# Patient Record
Sex: MALE | Race: WHITE | Employment: OTHER | URBAN - METROPOLITAN AREA
[De-identification: names, ages, dates, MRNs, and addresses within clinical notes are randomized per-mention and may not be internally consistent; named-entity substitution may affect disease eponyms.]

---

## 2021-01-15 DIAGNOSIS — Z23 NEED FOR VACCINATION: ICD-10-CM

## 2022-11-19 ENCOUNTER — HOSPITAL ENCOUNTER (EMERGENCY)
Dept: GENERAL RADIOLOGY | Age: 72
Discharge: HOME | End: 2022-11-22
Payer: MEDICARE

## 2022-11-19 ENCOUNTER — HOSPITAL ENCOUNTER (EMERGENCY)
Age: 72
Discharge: HOME | End: 2022-11-19
Attending: EMERGENCY MEDICINE
Payer: MEDICARE

## 2022-11-19 VITALS
DIASTOLIC BLOOD PRESSURE: 66 MMHG | HEART RATE: 60 BPM | OXYGEN SATURATION: 96 % | BODY MASS INDEX: 31.34 KG/M2 | TEMPERATURE: 98.1 F | RESPIRATION RATE: 18 BRPM | WEIGHT: 195 LBS | SYSTOLIC BLOOD PRESSURE: 132 MMHG | HEIGHT: 66 IN

## 2022-11-19 DIAGNOSIS — U07.1 COVID-19 VIRUS INFECTION: Primary | ICD-10-CM

## 2022-11-19 LAB
ALBUMIN SERPL-MCNC: 3.5 G/DL (ref 3.2–4.6)
ALBUMIN/GLOB SERPL: 0.9 {RATIO} (ref 0.4–1.6)
ALP SERPL-CCNC: 119 U/L (ref 50–136)
ALT SERPL-CCNC: 58 U/L (ref 12–65)
ANION GAP SERPL CALC-SCNC: 7 MMOL/L (ref 2–11)
AST SERPL-CCNC: 39 U/L (ref 15–37)
BASOPHILS # BLD: 0.1 K/UL (ref 0–0.2)
BASOPHILS NFR BLD: 1 % (ref 0–2)
BILIRUB SERPL-MCNC: 0.5 MG/DL (ref 0.2–1.1)
BUN SERPL-MCNC: 16 MG/DL (ref 8–23)
CALCIUM SERPL-MCNC: 8.7 MG/DL (ref 8.3–10.4)
CHLORIDE SERPL-SCNC: 107 MMOL/L (ref 101–110)
CO2 SERPL-SCNC: 25 MMOL/L (ref 21–32)
CREAT SERPL-MCNC: 1.37 MG/DL (ref 0.8–1.5)
DIFFERENTIAL METHOD BLD: ABNORMAL
EKG ATRIAL RATE: 60 BPM
EKG DIAGNOSIS: NORMAL
EKG P AXIS: 38 DEGREES
EKG P-R INTERVAL: 142 MS
EKG Q-T INTERVAL: 436 MS
EKG QRS DURATION: 82 MS
EKG QTC CALCULATION (BAZETT): 436 MS
EKG R AXIS: -19 DEGREES
EKG T AXIS: 11 DEGREES
EKG VENTRICULAR RATE: 60 BPM
EOSINOPHIL # BLD: 0.2 K/UL (ref 0–0.8)
EOSINOPHIL NFR BLD: 2 % (ref 0.5–7.8)
ERYTHROCYTE [DISTWIDTH] IN BLOOD BY AUTOMATED COUNT: 12.7 % (ref 11.9–14.6)
GLOBULIN SER CALC-MCNC: 3.7 G/DL (ref 2.8–4.5)
GLUCOSE SERPL-MCNC: 101 MG/DL (ref 65–100)
HCT VFR BLD AUTO: 40.8 % (ref 41.1–50.3)
HGB BLD-MCNC: 13.6 G/DL (ref 13.6–17.2)
IMM GRANULOCYTES # BLD AUTO: 0 K/UL (ref 0–0.5)
IMM GRANULOCYTES NFR BLD AUTO: 0 % (ref 0–5)
LYMPHOCYTES # BLD: 1.1 K/UL (ref 0.5–4.6)
LYMPHOCYTES NFR BLD: 11 % (ref 13–44)
MAGNESIUM SERPL-MCNC: 2.2 MG/DL (ref 1.8–2.4)
MCH RBC QN AUTO: 31.1 PG (ref 26.1–32.9)
MCHC RBC AUTO-ENTMCNC: 33.3 G/DL (ref 31.4–35)
MCV RBC AUTO: 93.2 FL (ref 82–102)
MONOCYTES # BLD: 1.4 K/UL (ref 0.1–1.3)
MONOCYTES NFR BLD: 14 % (ref 4–12)
NEUTS SEG # BLD: 7.1 K/UL (ref 1.7–8.2)
NEUTS SEG NFR BLD: 72 % (ref 43–78)
NRBC # BLD: 0 K/UL (ref 0–0.2)
NT PRO BNP: 322 PG/ML (ref 5–125)
PLATELET # BLD AUTO: 193 K/UL (ref 150–450)
PMV BLD AUTO: 11 FL (ref 9.4–12.3)
POTASSIUM SERPL-SCNC: 4.3 MMOL/L (ref 3.5–5.1)
PROT SERPL-MCNC: 7.2 G/DL (ref 6.3–8.2)
RBC # BLD AUTO: 4.38 M/UL (ref 4.23–5.6)
SARS-COV-2 RDRP RESP QL NAA+PROBE: DETECTED
SODIUM SERPL-SCNC: 139 MMOL/L (ref 133–143)
SOURCE: ABNORMAL
STREP, MOLECULAR: NOT DETECTED
TROPONIN I SERPL HS-MCNC: 7.1 PG/ML (ref 0–14)
WBC # BLD AUTO: 9.9 K/UL (ref 4.3–11.1)

## 2022-11-19 PROCEDURE — 87635 SARS-COV-2 COVID-19 AMP PRB: CPT

## 2022-11-19 PROCEDURE — 71045 X-RAY EXAM CHEST 1 VIEW: CPT

## 2022-11-19 PROCEDURE — 85025 COMPLETE CBC W/AUTO DIFF WBC: CPT

## 2022-11-19 PROCEDURE — 80053 COMPREHEN METABOLIC PANEL: CPT

## 2022-11-19 PROCEDURE — 93005 ELECTROCARDIOGRAM TRACING: CPT | Performed by: EMERGENCY MEDICINE

## 2022-11-19 PROCEDURE — 84484 ASSAY OF TROPONIN QUANT: CPT

## 2022-11-19 PROCEDURE — 83735 ASSAY OF MAGNESIUM: CPT

## 2022-11-19 PROCEDURE — 83880 ASSAY OF NATRIURETIC PEPTIDE: CPT

## 2022-11-19 PROCEDURE — 99285 EMERGENCY DEPT VISIT HI MDM: CPT

## 2022-11-19 PROCEDURE — 87651 STREP A DNA AMP PROBE: CPT

## 2022-11-19 RX ORDER — TELMISARTAN 40 MG/1
40 TABLET ORAL DAILY
COMMUNITY

## 2022-11-19 RX ORDER — TESTOSTERONE 12.5 MG/1.25G
5 GEL TOPICAL DAILY
COMMUNITY

## 2022-11-19 RX ORDER — ATORVASTATIN CALCIUM 10 MG/1
10 TABLET, FILM COATED ORAL DAILY
COMMUNITY

## 2022-11-19 RX ORDER — GUAIFENESIN AND CODEINE PHOSPHATE 100; 10 MG/5ML; MG/5ML
10 SOLUTION ORAL 4 TIMES DAILY PRN
Qty: 200 ML | Refills: 0 | Status: SHIPPED | OUTPATIENT
Start: 2022-11-19 | End: 2022-11-24

## 2022-11-19 RX ORDER — ESOMEPRAZOLE MAGNESIUM 20 MG/1
20 FOR SUSPENSION ORAL DAILY
COMMUNITY

## 2022-11-19 ASSESSMENT — ENCOUNTER SYMPTOMS
ABDOMINAL PAIN: 0
DIARRHEA: 1
SORE THROAT: 1
CHEST TIGHTNESS: 0
COUGH: 1
VOMITING: 0
RHINORRHEA: 0
WHEEZING: 0
BACK PAIN: 1
SHORTNESS OF BREATH: 0
SINUS PRESSURE: 0
NAUSEA: 0

## 2022-11-19 ASSESSMENT — PAIN - FUNCTIONAL ASSESSMENT: PAIN_FUNCTIONAL_ASSESSMENT: NONE - DENIES PAIN

## 2022-11-19 NOTE — ED TRIAGE NOTES
Patient presents complaining of productive cough, sore throat, and fever. Productive of white sputum. Recent travel from [place]. Reports antibiotic use since 10/26/22 for mastoid infection.

## 2022-11-19 NOTE — ED NOTES
I have reviewed discharge instructions with the patient and spouse. The patient and spouse verbalized understanding. Patient left ED via Discharge Method: ambulatory to Home with spouse. Opportunity for questions and clarification provided. Patient given 1 scripts. To continue your aftercare when you leave the hospital, you may receive an automated call from our care team to check in on how you are doing. This is a free service and part of our promise to provide the best care and service to meet your aftercare needs.  If you have questions, or wish to unsubscribe from this service please call [phone]. Thank you for Choosing our [facility] Emergency Department.       Elba Jack RN  11/19/22 [id number]

## 2022-11-19 NOTE — ED PROVIDER NOTES
[employer] Emergency Department Provider Note                   PCP:                Iraida Zapata MD               Age: 67 y.o. Sex: male       Dima Al is a 67 y.o. male who presents to the Emergency Department with chief complaint of    Chief Complaint   Patient presents with    Cough     Patient presents complaining of productive cough, sore throat, and fever. Productive of white sputum. Recent travel from [place]. Reports antibiotic (amoxicillin) use since 10/26/22 for mastoid infection. Patient states that 3 days ago he developed a productive cough with sore throat. Patient has had sweats and chills but did not have a thermometer until today. He checked his temperature and it was 100.7. Patient states that he has had a cough since June but he attributes that to allergies. It got much worse 3 days ago. Patient states that he has been on amoxicillin for the last 3 weeks for right-sided mastoiditis. The history is provided by the patient. All other systems reviewed and are negative. Review of Systems   Constitutional:  Positive for chills, diaphoresis and fever. HENT:  Positive for sore throat. Negative for congestion, rhinorrhea and sinus pressure. Eyes:  Negative for visual disturbance. Respiratory:  Positive for cough. Negative for chest tightness, shortness of breath and wheezing. Cardiovascular:  Negative for chest pain and leg swelling. Gastrointestinal:  Positive for diarrhea. Negative for abdominal pain, nausea and vomiting. Genitourinary:  Negative for decreased urine volume and dysuria. Musculoskeletal:  Positive for back pain. Negative for myalgias and neck pain. Skin:  Negative for rash. Neurological:  Positive for headaches. Negative for dizziness. No past medical history on file. No past surgical history on file. No family history on file.      Social History     Socioeconomic History    Marital status:         Allergies: Patient has oriented to person, place, and time.    Psychiatric:         Behavior: Behavior normal.        Orders Placed This Encounter   Procedures    COVID-19, Rapid    Group A Strep Screen By PCR    XR CHEST PORTABLE    CBC with Auto Differential    Comprehensive Metabolic Panel    Magnesium    Troponin    Brain Natriuretic Peptide    EKG 12 Lead    Saline lock IV       Procedures    ED EKG Interpretation  EKG was interpreted in the absence of a cardiologist.    Rate: Rate: Normal  EKG Interpretation: EKG Interpretation: sinus rhythm  ST Segments: Normal ST segments - NO STEMI      Results Include:    Recent Results (from the past 24 hour(s))   CBC with Auto Differential    Collection Time: 11/19/22  2:23 PM   Result Value Ref Range    WBC 9.9 4.3 - 11.1 K/uL    RBC 4.38 4.23 - 5.6 M/uL    Hemoglobin 13.6 13.6 - 17.2 g/dL    Hematocrit 40.8 (L) 41.1 - 50.3 %    MCV 93.2 82.0 - 102.0 FL    MCH 31.1 26.1 - 32.9 PG    MCHC 33.3 31.4 - 35.0 g/dL    RDW 12.7 11.9 - 14.6 %    Platelets 517 738 - 095 K/uL    MPV 11.0 9.4 - 12.3 FL    nRBC 0.00 0.0 - 0.2 K/uL    Differential Type AUTOMATED      Seg Neutrophils 72 43 - 78 %    Lymphocytes 11 (L) 13 - 44 %    Monocytes 14 (H) 4.0 - 12.0 %    Eosinophils % 2 0.5 - 7.8 %    Basophils 1 0.0 - 2.0 %    Immature Granulocytes 0 0.0 - 5.0 %    Segs Absolute 7.1 1.7 - 8.2 K/UL    Absolute Lymph # 1.1 0.5 - 4.6 K/UL    Absolute Mono # 1.4 (H) 0.1 - 1.3 K/UL    Absolute Eos # 0.2 0.0 - 0.8 K/UL    Basophils Absolute 0.1 0.0 - 0.2 K/UL    Absolute Immature Granulocyte 0.0 0.0 - 0.5 K/UL   Comprehensive Metabolic Panel    Collection Time: 11/19/22  2:23 PM   Result Value Ref Range    Sodium 139 133 - 143 mmol/L    Potassium 4.3 3.5 - 5.1 mmol/L    Chloride 107 101 - 110 mmol/L    CO2 25 21 - 32 mmol/L    Anion Gap 7 2 - 11 mmol/L    Glucose 101 (H) 65 - 100 mg/dL    BUN 16 8 - 23 MG/DL    Creatinine 1.37 0.8 - 1.5 MG/DL    Est, Glom Filt Rate 55 (L) >60 ml/min/1.73m2    Calcium 8.7 8.3 - 10.4 MG/DL    Total Bilirubin 0.5 0.2 - 1.1 MG/DL    ALT 58 12 - 65 U/L    AST 39 (H) 15 - 37 U/L    Alk Phosphatase 119 50 - 136 U/L    Total Protein 7.2 6.3 - 8.2 g/dL    Albumin 3.5 3.2 - 4.6 g/dL    Globulin 3.7 2.8 - 4.5 g/dL    Albumin/Globulin Ratio 0.9 0.4 - 1.6     Magnesium    Collection Time: 11/19/22  2:23 PM   Result Value Ref Range    Magnesium 2.2 1.8 - 2.4 mg/dL   Troponin    Collection Time: 11/19/22  2:23 PM   Result Value Ref Range    Troponin, High Sensitivity 7.1 0 - 14 pg/mL   Brain Natriuretic Peptide    Collection Time: 11/19/22  2:23 PM   Result Value Ref Range    NT Pro- (H) 5 - 125 PG/ML   EKG 12 Lead    Collection Time: 11/19/22  2:52 PM   Result Value Ref Range    Ventricular Rate 60 BPM    Atrial Rate 60 BPM    P-R Interval 142 ms    QRS Duration 82 ms    Q-T Interval 436 ms    QTc Calculation (Bazett) 436 ms    P Axis 38 degrees    R Axis -19 degrees    T Axis 11 degrees    Diagnosis Normal sinus rhythm    COVID-19, Rapid    Collection Time: 11/19/22  2:55 PM    Specimen: Nasopharyngeal   Result Value Ref Range    Source Nasopharyngeal      SARS-CoV-2, Rapid Detected (AA) NOTD     Group A Strep Screen By PCR    Collection Time: 11/19/22  2:55 PM    Specimen: Throat   Result Value Ref Range    Strep, Molecular Not detected            XR CHEST PORTABLE   Final Result   Unremarkable portable chest radiograph. ED Course as of 11/19/22 2130   Sat Nov 19, 2022   1630 Patient is resting comfortably in bed and still well-appearing. I explained the results and plan to patient and his wife and they are comfortable with discharge. [CW]      ED Course User Index  [CW] Maranda Azul MD       MDM  Number of Diagnoses or Management Options  COVID-19 virus infection  Diagnosis management comments: Patient presented for evaluation of fever, cough, and sore throat. Patient's chest x-ray showed no acute findings.   Patient has clear lung sounds with normal pulse ox. Patient's strep test is negative and there is no evidence of peritonsillar or retropharyngeal abscess. Patient's COVID test was positive. I ordered flu test however nursing accidentally sent the wrong swab to the lab and they could not run it. I discussed repeating the swab with patient but he did not want it since it will not change his management since he is out of the Tamiflu window. Nursing initiated cardiac work-up. His EKG showed normal sinus rhythm without any acute ischemic changes. Patient's troponin was normal and ACS was ruled out. Patient's BNP level is mildly elevated but there is no evidence of CHF clinically or on chest x-ray and I do not suspect this. I do not suspect coronary disease, PE, pericardial disease, or any other acute cardiopulmonary process. Patient is well-appearing and there is no indication of sepsis. I did discuss the risk and benefits of Paxlovid with the patient but he declined a prescription for this. I recommend patient drink plenty of fluids and take Tylenol Motrin for fever and muscle aches. Patient was discharged home with a prescription of codeine cough medicine and given primary care referral.        Explanation: The diagnosis and plan as well as the results of any testing (if done) and treatments (if done) today in the emergency department were communicated to the patient and/or their family/caregiver (if present). The patient/caregiver verbalized understanding and compliance with the treatment plan and reasons to return immediately to the emergency department. All questions were answered at bedside      ICD-10-CM    1. COVID-19 virus infection  U07.1 guaiFENesin-codeine (TUSSI-ORGANIDIN NR) 100-10 MG/5ML syrup          DISPOSITION Decision To Discharge 11/19/2022 04:34:02 PM       Voice dictation software was used during the making of this note. This software is not perfect and grammatical and other typographical errors may be present.   This note has not been completely proofread for errors.      Ketan Martin MD  11/19/22 2426